# Patient Record
Sex: MALE | Race: WHITE | Employment: FULL TIME | URBAN - NONMETROPOLITAN AREA
[De-identification: names, ages, dates, MRNs, and addresses within clinical notes are randomized per-mention and may not be internally consistent; named-entity substitution may affect disease eponyms.]

---

## 2017-12-20 DIAGNOSIS — R00.2 PALPITATIONS: ICD-10-CM

## 2017-12-20 DIAGNOSIS — I10 ESSENTIAL HYPERTENSION: ICD-10-CM

## 2017-12-20 RX ORDER — METOPROLOL SUCCINATE 25 MG/1
25 TABLET, EXTENDED RELEASE ORAL DAILY
Qty: 30 TABLET | Refills: 5 | Status: SHIPPED | OUTPATIENT
Start: 2017-12-20 | End: 2018-01-03 | Stop reason: DRUGHIGH

## 2018-01-03 ENCOUNTER — OFFICE VISIT (OUTPATIENT)
Dept: CARDIOLOGY | Age: 43
End: 2018-01-03
Payer: COMMERCIAL

## 2018-01-03 VITALS
WEIGHT: 315 LBS | HEIGHT: 72 IN | SYSTOLIC BLOOD PRESSURE: 142 MMHG | HEART RATE: 78 BPM | DIASTOLIC BLOOD PRESSURE: 64 MMHG | BODY MASS INDEX: 42.66 KG/M2

## 2018-01-03 DIAGNOSIS — I10 ESSENTIAL HYPERTENSION: ICD-10-CM

## 2018-01-03 DIAGNOSIS — R00.2 PALPITATIONS: Primary | ICD-10-CM

## 2018-01-03 PROCEDURE — 99213 OFFICE O/P EST LOW 20 MIN: CPT | Performed by: INTERNAL MEDICINE

## 2018-01-03 RX ORDER — METOPROLOL SUCCINATE 50 MG/1
50 TABLET, EXTENDED RELEASE ORAL DAILY
Qty: 30 TABLET | Refills: 5 | Status: SHIPPED | OUTPATIENT
Start: 2018-01-03 | End: 2018-07-17 | Stop reason: SDUPTHER

## 2018-01-03 RX ORDER — METOPROLOL SUCCINATE 50 MG/1
50 TABLET, EXTENDED RELEASE ORAL DAILY
COMMUNITY
End: 2018-01-03 | Stop reason: SDUPTHER

## 2018-01-03 NOTE — PROGRESS NOTES
[facility] Patient Office Visit    Paulina Washington  [address]  Phone: [phone]  Fax: [phone]        1/3/2018    Chief Complaint / Reason for the Visit   Follow up of:  Palpitations and HTN       Specialty Problems        Cardiology Problems    Hypertension              Current Status Today According to the patient:  \"I can definitely tell when I miss my metoprolol\"    Subjective:  Mr. Jailyn Packer is generally feeling stable. Mr. Jailyn Packer has the following cardiac complaints / symptoms today:    1. Palpitations, managed with Metoprolol    2. HTN, Is stable on current medications          Jailyn Packer is a 38 y.o. male with the following history as recorded in [facility]:    Patient Active Problem List    Diagnosis Date Noted    YANI (obstructive sleep apnea) 03/20/2013    Palpitations 08/22/2012    Hypertension 08/22/2012    Psoriasis 08/22/2012     Current Outpatient Prescriptions   Medication Sig Dispense Refill    metoprolol succinate (TOPROL XL) 50 MG extended release tablet Take 50 mg by mouth daily      adalimumab (HUMIRA) 40 MG/0.8ML injection Inject 40 mg into the skin once      ranitidine (ZANTAC) 150 MG tablet Take 150 mg by mouth nightly       chlorthalidone (HYGROTON) 25 MG tablet Take 12.5 mg by mouth daily      enalapril (VASOTEC) 20 MG tablet Take 20 mg by mouth daily      pantoprazole (PROTONIX) 40 MG tablet Take 40 mg by mouth daily. No current facility-administered medications for this visit. Allergies: Review of patient's allergies indicates no known allergies.   Past Medical History:   Diagnosis Date    Hypertension 8/22/2012    YANI (obstructive sleep apnea) 3/20/2013    Palpitations 8/22/2012    Psoriasis 8/22/2012    Sleep apnea      Past Surgical History:   Procedure Laterality Date    HEMORRHOID SURGERY      TONSILLECTOMY       Family History   Problem Relation Age of Onset    Diabetes records:    Patient has a history of these risk factors, which are managed medically, and are on current oral therapy I personally addressed, counselled and educated the patient on this problem / risk factor. I will personally continue and manage prescribed medications and monitor the course of the therapy. Occurs quarterly  Seen in cafeteria and pulse palpitated > 200 beats/min  8/22/12  SE  negative for myocardial ischemia, DTS 4.5 [facility])  8/22/12  Echo normal LVFX  9/26/2012  Holter NSR No Continue current medications:     Yes           2. HTN  show no change   Patient has a history of these risk factors, which are managed medically, and are on current oral therapy I personally addressed, counselled and educated the patient on this problem / risk factor. I will personally continue and manage prescribed medications and monitor the course of the therapy. No Continue current medications:    {Yes           3.   show no change    NA Continue current medications:       NA         Discussed with patient. Follow Up Visit Scheduled for:  1 year    I greatly appreciate the opportunity to meet Negra Settler and your confidence in allowing me to participate in his cardiovascular care. [facility] [address] [place] [address] am scribing for and in the presence of J. Thomas Osgood, MD,[facility]. Kate Ray MA     10:42 AM  I, J. Thomas Osgood, MD, [facility], personally performed the services described in this documentation as scribed by Kate Ray in my presence, and it is both accurate and complete. Electronically signed by Creta Boeck.  Thomas Osgood, MD, [facility]    1/3/18 4:15 PM

## 2018-07-17 RX ORDER — METOPROLOL SUCCINATE 50 MG/1
50 TABLET, EXTENDED RELEASE ORAL DAILY
Qty: 30 TABLET | Refills: 5 | Status: SHIPPED | OUTPATIENT
Start: 2018-07-17 | End: 2019-01-14 | Stop reason: SDUPTHER

## 2019-01-08 ENCOUNTER — TELEPHONE (OUTPATIENT)
Dept: CARDIOLOGY | Age: 44
End: 2019-01-08

## 2019-01-14 ENCOUNTER — OFFICE VISIT (OUTPATIENT)
Dept: CARDIOLOGY | Age: 44
End: 2019-01-14
Payer: COMMERCIAL

## 2019-01-14 VITALS
WEIGHT: 315 LBS | SYSTOLIC BLOOD PRESSURE: 132 MMHG | HEIGHT: 72 IN | HEART RATE: 86 BPM | DIASTOLIC BLOOD PRESSURE: 72 MMHG | BODY MASS INDEX: 42.66 KG/M2

## 2019-01-14 DIAGNOSIS — G47.33 OSA (OBSTRUCTIVE SLEEP APNEA): ICD-10-CM

## 2019-01-14 DIAGNOSIS — R00.2 PALPITATIONS: ICD-10-CM

## 2019-01-14 DIAGNOSIS — I10 ESSENTIAL HYPERTENSION: Primary | ICD-10-CM

## 2019-01-14 PROCEDURE — 99213 OFFICE O/P EST LOW 20 MIN: CPT | Performed by: NURSE PRACTITIONER

## 2019-01-14 RX ORDER — METOPROLOL SUCCINATE 50 MG/1
50 TABLET, EXTENDED RELEASE ORAL DAILY
Qty: 30 TABLET | Refills: 5 | Status: SHIPPED | OUTPATIENT
Start: 2019-01-14 | End: 2020-02-18 | Stop reason: SDUPTHER

## 2019-02-06 ENCOUNTER — TELEPHONE (OUTPATIENT)
Dept: CARDIOLOGY | Age: 44
End: 2019-02-06

## 2019-02-08 ENCOUNTER — OFFICE VISIT (OUTPATIENT)
Dept: CARDIOLOGY | Age: 44
End: 2019-02-08
Payer: COMMERCIAL

## 2019-02-08 ENCOUNTER — TELEPHONE (OUTPATIENT)
Dept: CARDIOLOGY | Age: 44
End: 2019-02-08

## 2019-02-08 VITALS
WEIGHT: 315 LBS | DIASTOLIC BLOOD PRESSURE: 80 MMHG | SYSTOLIC BLOOD PRESSURE: 128 MMHG | BODY MASS INDEX: 42.66 KG/M2 | HEIGHT: 72 IN | HEART RATE: 66 BPM

## 2019-02-08 DIAGNOSIS — R94.39 ABNORMAL STRESS ECHO: ICD-10-CM

## 2019-02-08 DIAGNOSIS — R07.89 OTHER CHEST PAIN: Primary | ICD-10-CM

## 2019-02-08 DIAGNOSIS — I10 ESSENTIAL HYPERTENSION: ICD-10-CM

## 2019-02-08 DIAGNOSIS — E66.01 CLASS 3 SEVERE OBESITY DUE TO EXCESS CALORIES WITHOUT SERIOUS COMORBIDITY WITH BODY MASS INDEX (BMI) OF 40.0 TO 44.9 IN ADULT (HCC): ICD-10-CM

## 2019-02-08 PROCEDURE — 99213 OFFICE O/P EST LOW 20 MIN: CPT | Performed by: CLINICAL NURSE SPECIALIST

## 2019-02-08 ASSESSMENT — ENCOUNTER SYMPTOMS
ABDOMINAL PAIN: 0
APNEA: 1
SHORTNESS OF BREATH: 1
CHEST TIGHTNESS: 1
VOMITING: 0
FACIAL SWELLING: 0
EYE REDNESS: 0
COUGH: 0
WHEEZING: 0
NAUSEA: 0

## 2019-02-11 ENCOUNTER — TELEPHONE (OUTPATIENT)
Dept: CARDIOLOGY | Age: 44
End: 2019-02-11

## 2019-03-06 ENCOUNTER — HOSPITAL ENCOUNTER (OUTPATIENT)
Dept: CARDIAC CATH/INVASIVE PROCEDURES | Age: 44
Discharge: HOME | End: 2019-03-06
Attending: INTERNAL MEDICINE | Admitting: INTERNAL MEDICINE
Payer: COMMERCIAL

## 2019-03-06 ENCOUNTER — APPOINTMENT (OUTPATIENT)
Dept: GENERAL RADIOLOGY | Age: 44
End: 2019-03-06
Attending: INTERNAL MEDICINE
Payer: COMMERCIAL

## 2019-03-06 VITALS
HEART RATE: 80 BPM | RESPIRATION RATE: 18 BRPM | TEMPERATURE: 98.4 F | SYSTOLIC BLOOD PRESSURE: 125 MMHG | BODY MASS INDEX: 42.66 KG/M2 | OXYGEN SATURATION: 99 % | HEIGHT: 72 IN | DIASTOLIC BLOOD PRESSURE: 55 MMHG | WEIGHT: 315 LBS

## 2019-03-06 DIAGNOSIS — R94.39 ABNORMAL STRESS ECHO: ICD-10-CM

## 2019-03-06 LAB
ANION GAP SERPL CALCULATED.3IONS-SCNC: 11 MMOL/L (ref 7–19)
BUN BLDV-MCNC: 19 MG/DL (ref 6–20)
CALCIUM SERPL-MCNC: 9.2 MG/DL (ref 8.6–10)
CHLORIDE BLD-SCNC: 103 MMOL/L (ref 98–111)
CO2: 27 MMOL/L (ref 22–29)
CREAT SERPL-MCNC: 0.9 MG/DL (ref 0.5–1.2)
GFR NON-AFRICAN AMERICAN: >60
GLUCOSE BLD-MCNC: 155 MG/DL (ref 74–109)
HCT VFR BLD CALC: 44.9 % (ref 42–52)
HEMOGLOBIN: 14.5 G/DL (ref 14–18)
MCH RBC QN AUTO: 27.7 PG (ref 27–31)
MCHC RBC AUTO-ENTMCNC: 32.3 G/DL (ref 33–37)
MCV RBC AUTO: 85.7 FL (ref 80–94)
PDW BLD-RTO: 14.2 % (ref 11.5–14.5)
PLATELET # BLD: 191 K/UL (ref 130–400)
PMV BLD AUTO: 10.5 FL (ref 9.4–12.4)
POTASSIUM SERPL-SCNC: 4 MMOL/L (ref 3.5–5)
RBC # BLD: 5.24 M/UL (ref 4.7–6.1)
SODIUM BLD-SCNC: 141 MMOL/L (ref 136–145)
WBC # BLD: 7.4 K/UL (ref 4.8–10.8)

## 2019-03-06 PROCEDURE — 85027 COMPLETE CBC AUTOMATED: CPT

## 2019-03-06 PROCEDURE — 93458 L HRT ARTERY/VENTRICLE ANGIO: CPT | Performed by: INTERNAL MEDICINE

## 2019-03-06 PROCEDURE — 93005 ELECTROCARDIOGRAM TRACING: CPT

## 2019-03-06 PROCEDURE — 36415 COLL VENOUS BLD VENIPUNCTURE: CPT

## 2019-03-06 PROCEDURE — 2709999900 HC NON-CHARGEABLE SUPPLY

## 2019-03-06 PROCEDURE — 99152 MOD SED SAME PHYS/QHP 5/>YRS: CPT | Performed by: INTERNAL MEDICINE

## 2019-03-06 PROCEDURE — 80048 BASIC METABOLIC PNL TOTAL CA: CPT

## 2019-03-06 PROCEDURE — C1760 CLOSURE DEV, VASC: HCPCS

## 2019-03-06 PROCEDURE — 2580000003 HC RX 258: Performed by: INTERNAL MEDICINE

## 2019-03-06 PROCEDURE — C1894 INTRO/SHEATH, NON-LASER: HCPCS

## 2019-03-06 PROCEDURE — 6360000004 HC RX CONTRAST MEDICATION: Performed by: INTERNAL MEDICINE

## 2019-03-06 PROCEDURE — 6360000002 HC RX W HCPCS

## 2019-03-06 PROCEDURE — 71046 X-RAY EXAM CHEST 2 VIEWS: CPT

## 2019-03-06 PROCEDURE — 99153 MOD SED SAME PHYS/QHP EA: CPT | Performed by: INTERNAL MEDICINE

## 2019-03-06 RX ORDER — SODIUM CHLORIDE 9 MG/ML
INJECTION, SOLUTION INTRAVENOUS CONTINUOUS
Status: DISCONTINUED | OUTPATIENT
Start: 2019-03-06 | End: 2019-03-06 | Stop reason: HOSPADM

## 2019-03-06 RX ORDER — ACETAMINOPHEN 500 MG
1500 TABLET ORAL EVERY 6 HOURS PRN
COMMUNITY
End: 2020-02-18

## 2019-03-06 RX ADMIN — SODIUM CHLORIDE: 9 INJECTION, SOLUTION INTRAVENOUS at 08:01

## 2019-03-06 RX ADMIN — IOPAMIDOL 86 ML: 612 INJECTION, SOLUTION INTRAVENOUS at 11:02

## 2019-03-07 LAB
LV EF: 60 %
LVEF MODALITY: NORMAL

## 2019-03-19 LAB
EKG P AXIS: 28 DEGREES
EKG P-R INTERVAL: 208 MS
EKG Q-T INTERVAL: 348 MS
EKG QRS DURATION: 94 MS
EKG QTC CALCULATION (BAZETT): 383 MS
EKG T AXIS: 13 DEGREES

## 2019-08-06 PROCEDURE — 88305 TISSUE EXAM BY PATHOLOGIST: CPT | Performed by: INTERNAL MEDICINE

## 2019-08-08 ENCOUNTER — LAB REQUISITION (OUTPATIENT)
Dept: LAB | Facility: HOSPITAL | Age: 44
End: 2019-08-08

## 2019-08-08 DIAGNOSIS — Z00.00 ROUTINE GENERAL MEDICAL EXAMINATION AT A HEALTH CARE FACILITY: ICD-10-CM

## 2019-08-09 LAB
CYTO UR: NORMAL
LAB AP CASE REPORT: NORMAL
LAB AP CLINICAL INFORMATION: NORMAL
PATH REPORT.FINAL DX SPEC: NORMAL
PATH REPORT.GROSS SPEC: NORMAL

## 2020-02-18 ENCOUNTER — OFFICE VISIT (OUTPATIENT)
Dept: CARDIOLOGY | Age: 45
End: 2020-02-18
Payer: COMMERCIAL

## 2020-02-18 VITALS
DIASTOLIC BLOOD PRESSURE: 80 MMHG | SYSTOLIC BLOOD PRESSURE: 108 MMHG | HEIGHT: 72 IN | HEART RATE: 82 BPM | WEIGHT: 315 LBS | BODY MASS INDEX: 42.66 KG/M2

## 2020-02-18 PROCEDURE — 99213 OFFICE O/P EST LOW 20 MIN: CPT | Performed by: NURSE PRACTITIONER

## 2020-02-18 RX ORDER — METOPROLOL SUCCINATE 50 MG/1
50 TABLET, EXTENDED RELEASE ORAL DAILY
Qty: 30 TABLET | Refills: 5 | Status: SHIPPED | OUTPATIENT
Start: 2020-02-18

## 2020-02-18 NOTE — PATIENT INSTRUCTIONS
Hypertension  (High Blood Pressure)  Most people with high blood pressure (hypertension) have no symptoms. High blood pressure can be a dangerous problem. Hypertension is dangerous because you may have it and not know it. High blood pressure may mean that your heart needs to work harder to pump blood. Your blood pressure is measured with 2 numbers. The first number is when your heart flexes (contracts), and the second number is when your heart relaxes. The higher the numbers are, the more you are at risk for problems. Write down your blood pressure today. The best blood pressure for adults is 120/80 (mmHg) or lower. It is likely that your blood pressure was recorded at least 2 times today. It is important for you to give these numbers to your doctor. If you do not have a doctor, try to get follow-up care at a hospital or community clinic. You may need to start high blood pressure medicine. You may also need to adjust your medicines as told by your doctor. Even mild high blood pressure increases long-term health risks. One high reading does not mean you have hypertension. · Your blood pressure should be taken when you are relaxed. It is also important to sit for about 10 minutes before being tested. · Things that can increase your blood pressure are:  Injury, Illness, Stress, Caffeine, and some medicines (like decongestants). · High blood pressure does not usually need emergency treatment. HOME CARE  · Lifestyle and medicine changes may be needed, including:   · Weight loss. · Exercise. · Limit the use of salt. · Stop smoking. · If using decongestants or birth control pills, talk to your doctor. These medicines might make blood pressure higher. · Do not use street drugs. · Females should not drink more than 1 alcoholic drink per day. Males should not drink more than 2 alcoholic drinks per day. · Take your blood pressure medicine. You will need to take it every day.  If you do not get treated, there are risks, including:   · Heart disease. · Stroke. · Kidney failure. · See your doctor as told. GET HELP RIGHT AWAY IF:  · You get a very bad headache. · You get blurred or changing vision. · You feel confused. · You feel weak, numb, or faint. · You get chest or belly (abdominal) pain. · You throw up (vomit). · You cannot breathe very well. If you have a blood pressure reading with a top number of 180 or higher, you need to see your doctor right away. This is especially true if you are having any of the problems listed above.

## 2020-02-18 NOTE — PROGRESS NOTES
8/22/2012    Psoriasis 8/22/2012    Sleep apnea        Past Surgical History:   Procedure Laterality Date    CHOLECYSTECTOMY      COLONOSCOPY      ENDOSCOPY, COLON, DIAGNOSTIC      HEMORRHOID SURGERY      TONSILLECTOMY         Family History   Problem Relation Age of Onset    Diabetes Mother     High Blood Pressure Mother     Cancer Father     Heart Disease Paternal Grandfather         in 66's       Social History     Socioeconomic History    Marital status:      Spouse name: Not on file    Number of children: Not on file    Years of education: Not on file    Highest education level: Not on file   Occupational History    Not on file   Social Needs    Financial resource strain: Not on file    Food insecurity:     Worry: Not on file     Inability: Not on file    Transportation needs:     Medical: Not on file     Non-medical: Not on file   Tobacco Use    Smoking status: Never Smoker    Smokeless tobacco: Never Used   Substance and Sexual Activity    Alcohol use:  Yes    Drug use: No    Sexual activity: Not on file   Lifestyle    Physical activity:     Days per week: Not on file     Minutes per session: Not on file    Stress: Not on file   Relationships    Social connections:     Talks on phone: Not on file     Gets together: Not on file     Attends [employer] service: Not on file     Active member of club or organization: Not on file     Attends meetings of clubs or organizations: Not on file     Relationship status: Not on file    Intimate partner violence:     Fear of current or ex partner: Not on file     Emotionally abused: Not on file     Physically abused: Not on file     Forced sexual activity: Not on file   Other Topics Concern    Not on file   Social History Narrative    Not on file       No Known Allergies      Current Outpatient Medications:     DiphenhydrAMINE HCl (BANOPHEN PO), Take by mouth, Disp: , Rfl:     acetaminophen (TYLENOL) 500 MG tablet, Take 1,500 mg by mouth every 6 hours as needed for Pain, Disp: , Rfl:     Psyllium (METAMUCIL FIBER PO), Take by mouth daily, Disp: , Rfl:     metoprolol succinate (TOPROL XL) 50 MG extended release tablet, Take 1 tablet by mouth daily (Patient taking differently: Take 100 mg by mouth daily ), Disp: 30 tablet, Rfl: 5    adalimumab (HUMIRA) 40 MG/0.8ML injection, Inject 40 mg into the skin every 14 days , Disp: , Rfl:     ranitidine (ZANTAC) 150 MG tablet, Take 150 mg by mouth nightly , Disp: , Rfl:     chlorthalidone (HYGROTON) 25 MG tablet, Take 12.5 mg by mouth daily, Disp: , Rfl:     enalapril (VASOTEC) 20 MG tablet, Take 20 mg by mouth daily, Disp: , Rfl:     pantoprazole (PROTONIX) 40 MG tablet, Take 40 mg by mouth daily. , Disp: , Rfl:     PE:  There were no vitals filed for this visit. Estimated body mass index is 42.86 kg/m² as calculated from the following:    Height as of this encounter: 6' (1.829 m). Weight as of this encounter: 316 lb (143.3 kg). Constitutional: He is oriented to person, place, and time. He appears well-developed and well-nourished in no acute distress. Head: Normocephalic and atraumatic. Neck:  Neck supple without JVD present. Cardiovascular: Normal rate, Normal rhythm, normal heart sounds. no murmur ascultated. No gallop and no friction rub.  no carotid bruits. No peripheral edema. Pulmonary/Chest:  Lungs clear to auscultation bilaterally without evidence of respiratory distress. He without wheezes. He without rales or ronchi. Musculoskeletal: Normal range of motion. Gait is normal.   Neurological: He is alert and oriented to person, place, and time. Skin: Skin is warm and dry without rash or pallor. Psychiatric: He has a normal mood and affect. His behavior is normal. Thought content normal.     Lab Results   Component Value Date    CREATININE 0.9 03/06/2019    HGB 14.5 03/06/2019     Cath- 3/6/19  1. Successful femoral artery ultrasound  2.   Successful femoral artery arteriogram  3. Supervision of the administration of moderate conscious sedation  4. Mild coronary artery disease  5. Left ventricular function is normal        RECOMMENDATIONS:     1.  Reassurance  2. Risk factor modification  3. Evaluation of etiologies of non cardiac chest discomfort should symptoms persist or progress  4. Follow  Up with Primary care provider as arranged        Assessment, Recommendations, & Plan:  53 y.o. male with Mild CAD, HTN , Palpitations, & YANI    Mild CAD- On Toprol, No changes    HTN- Controlled, No changes    Palpitations- Controlled with Toprol. He will call with any increase in palpitations. No changes    YANI- Uses CPAP      Disposition - RTC in 1 year or sooner if needed      Please do not hesitate to contact me for any questions or concerns.     Sincerely yours,    LESLIE Avila

## 2021-02-17 ENCOUNTER — OFFICE VISIT (OUTPATIENT)
Dept: CARDIOLOGY CLINIC | Age: 46
End: 2021-02-17
Payer: COMMERCIAL

## 2021-02-17 VITALS
WEIGHT: 315 LBS | SYSTOLIC BLOOD PRESSURE: 131 MMHG | DIASTOLIC BLOOD PRESSURE: 75 MMHG | OXYGEN SATURATION: 100 % | HEIGHT: 72 IN | HEART RATE: 93 BPM | BODY MASS INDEX: 42.66 KG/M2

## 2021-02-17 DIAGNOSIS — I10 ESSENTIAL HYPERTENSION: Primary | ICD-10-CM

## 2021-02-17 PROCEDURE — 99213 OFFICE O/P EST LOW 20 MIN: CPT | Performed by: INTERNAL MEDICINE

## 2021-02-17 PROCEDURE — 93000 ELECTROCARDIOGRAM COMPLETE: CPT | Performed by: INTERNAL MEDICINE

## 2021-02-17 RX ORDER — CHOLECALCIFEROL (VITAMIN D3) 1250 MCG
CAPSULE ORAL WEEKLY
COMMUNITY
End: 2022-02-16

## 2021-02-17 RX ORDER — FAMOTIDINE 20 MG/1
20 TABLET, FILM COATED ORAL EVERY EVENING
COMMUNITY

## 2021-02-17 NOTE — PROGRESS NOTES
HISTORY  40-year-old gentleman with a history of hypertension, diabetes, sleep apnea, and reportedly coronary disease returns for routine follow-up visit. Angiographic assessment performed in March 2019, labeled as showing mild coronary disease. His blood pressure and lipids have been reportedly been controlled and after difficulty develop intolerance, he uses his CPAP nightly. On return today he denies ch3st discomfort or significant dyspnea with the only symptoms related occasional very brief palpitation. He gets no regular exercise. PHYSICAL EXAM  On exam carries 316 pounds on a 6 foot frame. Pressure is 131/75 with pulse of 93. Morbidly obese middle-aged gentleman with normal male balding pattern. EOMs full, sclerae and conjunctiva normal. PERRLA. Mask in place. Trachea midline with no neck masses. Assessment of internal jugular veins reveals no elevation of central venous pressure at 45 degrees. Carotid pulses normal without delay or bruit. Thyroid normal to palpation. Chest exam reveals normal respiratory effort, no abnormal breath sounds and normal expiratory phase. No skin lesions seen. PMI normal. S1, S2 normal without murmur or lino or click. Marked abdominal obesity. Normal bowel sounds without palpable mass or bruit. No clubbing or acrocyanosis. 1+ pretibial edema. General motor strength appears to be within normal limits. Normal range of motion with normal gait. Alert, oriented x 3, memory and cognition normal as reflected by history and conversation. EKG revealed normal sinus rhythm without significant abnormality. ASSESSMENT/PLAN:   1. Coronary disease - I reviewed the cath film from March 2019. His circumflex and LAD appear to have separate ostia [no left main] and I see no evidence of atherosclerotic involvement of his left coronary or lower his right. 2.  Hypertension - adequate control  3.   Dyslipidemia - reportedly controlled 4.  Lifestyle - discussed the need for him the benefits of regular exercise    Medical records reviewed prior to today's clinic visit including visually reviewing recent diagnostic studies such as ECHOs, labs, and angiograms as well as reading previous encounter notes. More than 15 minutes spent face-to-face with patient in evaluating, and carefully explaining problems and the planned approach and the reasons behind the decisions.

## 2021-04-10 ENCOUNTER — HOSPITAL ENCOUNTER (EMERGENCY)
Age: 46
Discharge: HOME | End: 2021-04-10
Attending: STUDENT IN AN ORGANIZED HEALTH CARE EDUCATION/TRAINING PROGRAM

## 2021-04-10 ENCOUNTER — APPOINTMENT (OUTPATIENT)
Dept: ULTRASOUND IMAGING | Age: 46
End: 2021-04-10
Attending: PHYSICIAN ASSISTANT

## 2021-04-10 ENCOUNTER — WALK IN (OUTPATIENT)
Dept: URGENT CARE | Age: 46
End: 2021-04-10
Attending: FAMILY MEDICINE

## 2021-04-10 VITALS
OXYGEN SATURATION: 100 % | HEIGHT: 72 IN | HEART RATE: 89 BPM | RESPIRATION RATE: 16 BRPM | BODY MASS INDEX: 42.66 KG/M2 | WEIGHT: 315 LBS | TEMPERATURE: 99.2 F | SYSTOLIC BLOOD PRESSURE: 121 MMHG | DIASTOLIC BLOOD PRESSURE: 84 MMHG

## 2021-04-10 VITALS
RESPIRATION RATE: 14 BRPM | SYSTOLIC BLOOD PRESSURE: 130 MMHG | DIASTOLIC BLOOD PRESSURE: 74 MMHG | HEART RATE: 91 BPM | WEIGHT: 312.83 LBS | OXYGEN SATURATION: 99 % | TEMPERATURE: 98.2 F | BODY MASS INDEX: 42.37 KG/M2 | HEIGHT: 72 IN

## 2021-04-10 DIAGNOSIS — I10 HYPERTENSION, UNSPECIFIED TYPE: ICD-10-CM

## 2021-04-10 DIAGNOSIS — R73.9 HYPERGLYCEMIA: ICD-10-CM

## 2021-04-10 DIAGNOSIS — L03.115 CELLULITIS OF RIGHT LOWER LEG: Primary | ICD-10-CM

## 2021-04-10 DIAGNOSIS — M79.89 REDNESS AND SWELLING OF LOWER LEG: Primary | ICD-10-CM

## 2021-04-10 DIAGNOSIS — R23.8 REDNESS AND SWELLING OF LOWER LEG: Primary | ICD-10-CM

## 2021-04-10 DIAGNOSIS — B34.9 VIRAL SYNDROME: ICD-10-CM

## 2021-04-10 LAB
ALBUMIN SERPL-MCNC: 3.3 G/DL (ref 3.6–5.1)
ALBUMIN/GLOB SERPL: 0.9 {RATIO} (ref 1–2.4)
ALP SERPL-CCNC: 37 UNITS/L (ref 45–117)
ALT SERPL-CCNC: 27 UNITS/L
ANION GAP SERPL CALC-SCNC: 10 MMOL/L (ref 10–20)
APTT PPP: 28 SEC (ref 22–30)
AST SERPL-CCNC: 13 UNITS/L
BASOPHILS # BLD: 0.1 K/MCL (ref 0–0.3)
BASOPHILS NFR BLD: 1 %
BILIRUB SERPL-MCNC: 0.8 MG/DL (ref 0.2–1)
BUN SERPL-MCNC: 17 MG/DL (ref 6–20)
BUN/CREAT SERPL: 18 (ref 7–25)
CALCIUM SERPL-MCNC: 9.3 MG/DL (ref 8.4–10.2)
CHLORIDE SERPL-SCNC: 101 MMOL/L (ref 98–107)
CO2 SERPL-SCNC: 29 MMOL/L (ref 21–32)
CREAT SERPL-MCNC: 0.94 MG/DL (ref 0.67–1.17)
DEPRECATED RDW RBC: 44.4 FL (ref 39–50)
EOSINOPHIL # BLD: 0.1 K/MCL (ref 0–0.5)
EOSINOPHIL NFR BLD: 1 %
ERYTHROCYTE [DISTWIDTH] IN BLOOD: 14.5 % (ref 11–15)
FASTING DURATION TIME PATIENT: ABNORMAL H
FASTING STATUS PATIENT QL REPORTED: ABNORMAL
GFR SERPLBLD BASED ON 1.73 SQ M-ARVRAT: >90 ML/MIN/1.73M2
GLOBULIN SER-MCNC: 3.8 G/DL (ref 2–4)
GLUCOSE SERPL-MCNC: 179 MG/DL (ref 65–99)
GLUCOSE SERPL-MCNC: 196 MG/DL (ref 65–99)
HCT VFR BLD CALC: 43.2 % (ref 39–51)
HGB BLD-MCNC: 14 G/DL (ref 13–17)
IMM GRANULOCYTES # BLD AUTO: 0.1 K/MCL (ref 0–0.2)
IMM GRANULOCYTES # BLD: 1 %
INR PPP: 1
LYMPHOCYTES # BLD: 2.2 K/MCL (ref 1–4.8)
LYMPHOCYTES NFR BLD: 23 %
MCH RBC QN AUTO: 27.2 PG (ref 26–34)
MCHC RBC AUTO-ENTMCNC: 32.4 G/DL (ref 32–36.5)
MCV RBC AUTO: 83.9 FL (ref 78–100)
MONOCYTES # BLD: 0.8 K/MCL (ref 0.3–0.9)
MONOCYTES NFR BLD: 9 %
NEUTROPHILS # BLD: 6.3 K/MCL (ref 1.8–7.7)
NEUTROPHILS NFR BLD: 65 %
NRBC BLD MANUAL-RTO: 0 /100 WBC
PLATELET # BLD AUTO: 157 K/MCL (ref 140–450)
POTASSIUM SERPL-SCNC: 3.4 MMOL/L (ref 3.4–5.1)
PROT SERPL-MCNC: 7.1 G/DL (ref 6.4–8.2)
PROTHROMBIN TIME: 10.8 SEC (ref 9.7–11.8)
RBC # BLD: 5.15 MIL/MCL (ref 4.5–5.9)
SARS-COV+SARS-COV-2 AG RESP QL IA.RAPID: NOT DETECTED
SODIUM SERPL-SCNC: 137 MMOL/L (ref 135–145)
WBC # BLD: 9.5 K/MCL (ref 4.2–11)

## 2021-04-10 PROCEDURE — C9803 HOPD COVID-19 SPEC COLLECT: HCPCS

## 2021-04-10 PROCEDURE — 85730 THROMBOPLASTIN TIME PARTIAL: CPT | Performed by: PHYSICIAN ASSISTANT

## 2021-04-10 PROCEDURE — 99284 EMERGENCY DEPT VISIT MOD MDM: CPT

## 2021-04-10 PROCEDURE — 82962 GLUCOSE BLOOD TEST: CPT | Performed by: FAMILY MEDICINE

## 2021-04-10 PROCEDURE — 85025 COMPLETE CBC W/AUTO DIFF WBC: CPT | Performed by: PHYSICIAN ASSISTANT

## 2021-04-10 PROCEDURE — 87426 SARSCOV CORONAVIRUS AG IA: CPT | Performed by: FAMILY MEDICINE

## 2021-04-10 PROCEDURE — 96374 THER/PROPH/DIAG INJ IV PUSH: CPT

## 2021-04-10 PROCEDURE — 10004651 HB RX, NO CHARGE ITEM: Performed by: FAMILY MEDICINE

## 2021-04-10 PROCEDURE — 99203 OFFICE O/P NEW LOW 30 MIN: CPT

## 2021-04-10 PROCEDURE — 80053 COMPREHEN METABOLIC PANEL: CPT | Performed by: PHYSICIAN ASSISTANT

## 2021-04-10 PROCEDURE — 85610 PROTHROMBIN TIME: CPT | Performed by: PHYSICIAN ASSISTANT

## 2021-04-10 PROCEDURE — 93971 EXTREMITY STUDY: CPT

## 2021-04-10 PROCEDURE — 10002800 HB RX 250 W HCPCS: Performed by: PHYSICIAN ASSISTANT

## 2021-04-10 RX ORDER — ADALIMUMAB 40MG/0.8ML
40 KIT SUBCUTANEOUS
COMMUNITY

## 2021-04-10 RX ORDER — ACETAMINOPHEN 325 MG/1
650 TABLET ORAL ONCE
Status: COMPLETED | OUTPATIENT
Start: 2021-04-10 | End: 2021-04-10

## 2021-04-10 RX ORDER — CEFAZOLIN SODIUM/WATER 2 G/20 ML
2000 SYRINGE (ML) INTRAVENOUS ONCE
Status: COMPLETED | OUTPATIENT
Start: 2021-04-10 | End: 2021-04-10

## 2021-04-10 RX ORDER — FAMOTIDINE 20 MG/1
20 TABLET, FILM COATED ORAL 2 TIMES DAILY
COMMUNITY

## 2021-04-10 RX ORDER — ENALAPRIL MALEATE 20 MG/1
20 TABLET ORAL DAILY
COMMUNITY

## 2021-04-10 RX ORDER — CHLORTHALIDONE 25 MG/1
25 TABLET ORAL DAILY
COMMUNITY

## 2021-04-10 RX ORDER — CEPHALEXIN 500 MG/1
500 CAPSULE ORAL 4 TIMES DAILY
Qty: 40 CAPSULE | Refills: 0 | Status: SHIPPED | OUTPATIENT
Start: 2021-04-10 | End: 2021-04-20

## 2021-04-10 RX ORDER — GLIPIZIDE 10 MG/1
20 TABLET, FILM COATED, EXTENDED RELEASE ORAL DAILY
COMMUNITY

## 2021-04-10 RX ORDER — SULFAMETHOXAZOLE AND TRIMETHOPRIM 800; 160 MG/1; MG/1
1 TABLET ORAL 2 TIMES DAILY
Qty: 20 TABLET | Refills: 0 | Status: SHIPPED | OUTPATIENT
Start: 2021-04-10

## 2021-04-10 RX ORDER — PANTOPRAZOLE SODIUM 40 MG/1
40 TABLET, DELAYED RELEASE ORAL DAILY
COMMUNITY

## 2021-04-10 RX ORDER — METOPROLOL SUCCINATE 100 MG/1
50 TABLET, EXTENDED RELEASE ORAL DAILY
COMMUNITY

## 2021-04-10 RX ADMIN — ACETAMINOPHEN 650 MG: 325 TABLET ORAL at 09:10

## 2021-04-10 RX ADMIN — Medication 2000 MG: at 14:03

## 2021-04-10 SDOH — HEALTH STABILITY: MENTAL HEALTH: HOW OFTEN DO YOU HAVE A DRINK CONTAINING ALCOHOL?: NEVER

## 2021-04-10 ASSESSMENT — ENCOUNTER SYMPTOMS
ENDOCRINE NEGATIVE: 1
COUGH: 1
HEMATOLOGIC/LYMPHATIC NEGATIVE: 1
FATIGUE: 1
COLOR CHANGE: 1
PSYCHIATRIC NEGATIVE: 1
NEUROLOGICAL NEGATIVE: 1

## 2021-04-10 ASSESSMENT — PAIN SCALES - GENERAL: PAINLEVEL_OUTOF10: 6

## 2022-02-16 ENCOUNTER — OFFICE VISIT (OUTPATIENT)
Dept: CARDIOLOGY CLINIC | Age: 47
End: 2022-02-16
Payer: COMMERCIAL

## 2022-02-16 VITALS
HEIGHT: 72 IN | BODY MASS INDEX: 42.66 KG/M2 | SYSTOLIC BLOOD PRESSURE: 122 MMHG | WEIGHT: 315 LBS | HEART RATE: 70 BPM | DIASTOLIC BLOOD PRESSURE: 74 MMHG

## 2022-02-16 DIAGNOSIS — R07.89 OTHER CHEST PAIN: Primary | ICD-10-CM

## 2022-02-16 DIAGNOSIS — R00.2 PALPITATIONS: ICD-10-CM

## 2022-02-16 PROCEDURE — 99213 OFFICE O/P EST LOW 20 MIN: CPT | Performed by: INTERNAL MEDICINE

## 2022-02-16 PROCEDURE — 93000 ELECTROCARDIOGRAM COMPLETE: CPT | Performed by: INTERNAL MEDICINE

## 2022-02-16 RX ORDER — CETIRIZINE HYDROCHLORIDE 10 MG/1
10 TABLET ORAL DAILY
COMMUNITY

## 2022-02-16 RX ORDER — MONTELUKAST SODIUM 4 MG/1
1 TABLET, CHEWABLE ORAL 2 TIMES DAILY
COMMUNITY

## 2022-02-16 NOTE — PROGRESS NOTES
HISTORY  56-year-old gentleman with a history of hypertension, diabetes, and sleep apnea returns for routine follow-up. Previously underwent angiographic assessment in March 2019 which demonstrated separate origins of his LAD and circumflex but no evidence of coronary atherosclerosis. On return today he relates no change in exercise tolerance [he is sedentary], no unusual shortness of breath, no palpitations, and no chest discomfort suspicious for angina. He did have some atypical chest discomfort during an episode of Covid recently at which time he was taken Allegra therapy. His blood pressure has been controlled and reportedly his lipids have been acceptable though he tells me his LDL is upper normal.  He has been vaccinated for COVID-19 but not received a booster. PHYSICAL EXAM  On exam he carries 318 pounds in a 6 foot frame. Pressure is 122/74 pulse of 70. EOMs full, sclerae and conjunctiva normal. PERRLA. Mask in place. Trachea midline with no neck masses. Assessment of internal jugular veins reveals no elevation of central venous pressure at 45 degrees. Carotid pulses normal without delay or bruit. Thyroid normal to palpation. Chest exam reveals normal respiratory effort, no abnormal breath sounds and normal expiratory phase. No skin lesions seen. PMI normal. S1, S2 normal without murmur or lino or click. Marked abdominal obesity. Normal bowel sounds without palpable mass or bruit. No clubbing or acrocyanosis. 1-2+ pretibial edema. General motor strength appears to be within normal limits. Normal range of motion with normal gait. Alert, oriented x 3, memory and cognition normal as reflected by history and conversation. EKG reveals sinus mechanism with some nonspecific ST-T wave changes. ASSESSMENT/PLAN:   1. Dyslipidemia -advised him that his target LDL was less than 100 given his hypertension and diabetes. 2.  Hypertension -adequately controlled.   Continue metoprolol, chlorthalidone, and enalapril. 3.  Pandemic response -vaccinated.   Needs booster

## 2023-01-30 ENCOUNTER — TELEPHONE (OUTPATIENT)
Dept: CARDIOLOGY CLINIC | Age: 48
End: 2023-01-30

## 2023-04-03 ENCOUNTER — TELEPHONE (OUTPATIENT)
Dept: CARDIOLOGY CLINIC | Age: 48
End: 2023-04-03

## 2023-04-03 NOTE — TELEPHONE ENCOUNTER
Pt scheduled on 04/12/2023, [place] out of clinic. Called and lvm to reschedule to next avail with APRN.

## 2023-05-08 ENCOUNTER — OFFICE VISIT (OUTPATIENT)
Dept: CARDIOLOGY CLINIC | Age: 48
End: 2023-05-08
Payer: COMMERCIAL

## 2023-05-08 VITALS
BODY MASS INDEX: 42.66 KG/M2 | DIASTOLIC BLOOD PRESSURE: 68 MMHG | WEIGHT: 315 LBS | HEART RATE: 75 BPM | HEIGHT: 72 IN | SYSTOLIC BLOOD PRESSURE: 130 MMHG

## 2023-05-08 DIAGNOSIS — I10 PRIMARY HYPERTENSION: ICD-10-CM

## 2023-05-08 DIAGNOSIS — E66.01 MORBID OBESITY WITH BMI OF 40.0-44.9, ADULT (HCC): ICD-10-CM

## 2023-05-08 DIAGNOSIS — G47.33 OSA (OBSTRUCTIVE SLEEP APNEA): ICD-10-CM

## 2023-05-08 DIAGNOSIS — I25.10 MILD CAD: Primary | ICD-10-CM

## 2023-05-08 DIAGNOSIS — R42 POSTURAL DIZZINESS: ICD-10-CM

## 2023-05-08 PROCEDURE — 99214 OFFICE O/P EST MOD 30 MIN: CPT | Performed by: CLINICAL NURSE SPECIALIST

## 2023-05-08 PROCEDURE — 3075F SYST BP GE 130 - 139MM HG: CPT | Performed by: CLINICAL NURSE SPECIALIST

## 2023-05-08 PROCEDURE — 93000 ELECTROCARDIOGRAM COMPLETE: CPT | Performed by: CLINICAL NURSE SPECIALIST

## 2023-05-08 PROCEDURE — 3078F DIAST BP <80 MM HG: CPT | Performed by: CLINICAL NURSE SPECIALIST

## 2023-05-08 RX ORDER — WHEAT DEXTRIN 3 G/4 G
1 POWDER IN PACKET (EA) ORAL DAILY
COMMUNITY

## 2023-05-08 ASSESSMENT — ENCOUNTER SYMPTOMS
SHORTNESS OF BREATH: 0
WHEEZING: 0
FACIAL SWELLING: 0
VOMITING: 0
NAUSEA: 0
EYE REDNESS: 0
COUGH: 0
CHEST TIGHTNESS: 0
ABDOMINAL PAIN: 0

## 2023-05-08 NOTE — PATIENT INSTRUCTIONS
Return in about 1 year (around 5/8/2024) for APRN. Work up to 30 min of exercise 5 times per week   Consider DASH or Mediterranean diet for weight loss.    Decrease Chlorthalidone to 1/2 tab daily  Be sure to drink 8 cups of fluid daily  Change positions slowly

## 2023-05-08 NOTE — PROGRESS NOTES
Answer:   Chest pain     Return in about 1 year (around 5/8/2024) for LESLIE. Work up to 30 min of exercise 5 times per week   Consider DASH or Mediterranean diet for weight loss. Decrease Chlorthalidone to 1/2 tab daily  Be sure to drink 8 cups of fluid daily    Call with any questionsor concerns  Follow up with Marlena Rodriguez MD for non cardiac problems  Report any new problems  Cardiovascular Fitness-Exercise as tolerated. Strive for 15 minutes of exercise most days of the week. Cardiac / HealthyDiet  Continue current medications as directed  Continue plan of treatment  It is always recommended that you bring your medicationsbottles with you to each visit - this is for your safety!        LESLIE Dias

## 2024-05-13 ENCOUNTER — OFFICE VISIT (OUTPATIENT)
Dept: CARDIOLOGY CLINIC | Age: 49
End: 2024-05-13
Payer: COMMERCIAL

## 2024-05-13 VITALS
HEIGHT: 72 IN | HEART RATE: 74 BPM | BODY MASS INDEX: 41.99 KG/M2 | SYSTOLIC BLOOD PRESSURE: 108 MMHG | WEIGHT: 310 LBS | DIASTOLIC BLOOD PRESSURE: 70 MMHG

## 2024-05-13 DIAGNOSIS — I10 PRIMARY HYPERTENSION: Primary | ICD-10-CM

## 2024-05-13 DIAGNOSIS — E11.69 TYPE 2 DIABETES MELLITUS WITH OTHER SPECIFIED COMPLICATION, WITHOUT LONG-TERM CURRENT USE OF INSULIN (HCC): ICD-10-CM

## 2024-05-13 DIAGNOSIS — I25.10 MILD CAD: ICD-10-CM

## 2024-05-13 DIAGNOSIS — R00.2 PALPITATIONS: ICD-10-CM

## 2024-05-13 DIAGNOSIS — E66.01 MORBID OBESITY WITH BMI OF 40.0-44.9, ADULT (HCC): ICD-10-CM

## 2024-05-13 DIAGNOSIS — R42 POSTURAL DIZZINESS: ICD-10-CM

## 2024-05-13 DIAGNOSIS — G47.33 OSA (OBSTRUCTIVE SLEEP APNEA): ICD-10-CM

## 2024-05-13 PROCEDURE — 3078F DIAST BP <80 MM HG: CPT | Performed by: CLINICAL NURSE SPECIALIST

## 2024-05-13 PROCEDURE — 99214 OFFICE O/P EST MOD 30 MIN: CPT | Performed by: CLINICAL NURSE SPECIALIST

## 2024-05-13 PROCEDURE — 93000 ELECTROCARDIOGRAM COMPLETE: CPT | Performed by: CLINICAL NURSE SPECIALIST

## 2024-05-13 PROCEDURE — 3074F SYST BP LT 130 MM HG: CPT | Performed by: CLINICAL NURSE SPECIALIST

## 2024-05-13 RX ORDER — MULTIVIT-MIN/IRON/FOLIC ACID/K 18-600-40
CAPSULE ORAL WEEKLY
COMMUNITY

## 2024-05-13 ASSESSMENT — ENCOUNTER SYMPTOMS
COUGH: 0
CHEST TIGHTNESS: 0
EYE REDNESS: 0
FACIAL SWELLING: 0
ABDOMINAL PAIN: 0
VOMITING: 0
NAUSEA: 0
WHEEZING: 0
SHORTNESS OF BREATH: 0

## 2024-05-13 NOTE — PATIENT INSTRUCTIONS
Return for APRN, as needed.  Work up to 30 min of exercise 5 times per week   Consider DASH or Mediterranean diet for weight loss.   Decrease Chlorthalidone to 1/2 tab daily  Be sure to drink 8 cups of fluid daily

## 2024-05-13 NOTE — PROGRESS NOTES
[facility]  [address] [address], [place] [address]  Phone: [phone]  Fax: [phone]    OFFICE VISIT:  2024    Chan Sigala - : 1975    Reason For Visit:  Chan is a 48 y.o. male who is here for Follow-up (No cardiac symptoms) and Mild CAD       Diagnosis Orders   1. Primary hypertension  EKG 12 lead      2. Mild CAD  EKG 12 lead      3. YANI (obstructive sleep apnea)        4. Morbid obesity with BMI of 40.0-44.9, adult ([facility])        5. Postural dizziness        6. Type 2 diabetes mellitus with other specified complication, without long-term current use of insulin ([facility])        7. Palpitations              HPI  Pt with a history of mild, nonobstructive CAD per heart cath in , hypertension, diabetes, morbid obesity, and sleep apnea returns for routine follow-up.  Previously underwent angiographic assessment in 2019 with mild nonobstructive CAD    He is here today for routine follow-up.  He denies chest pain, unusual dyspnea, orthopnea, PND, edema, palpitations.    He continues to have some mild dizziness with position change when he jumps up quickly from a seated position.  We discussed possibly cutting back his chlorthalidone last visit, but he states he is continuing to take a full tablet.    Fauzia Stanley MD is PCP.  Chan Sigala has the following history as recorded in [facility]:    Patient Active Problem List    Diagnosis Date Noted    Abnormal stress echo     Other chest pain 2019    Class 3 severe obesity without serious comorbidity with body mass index (BMI) of 40.0 to 44.9 in adult ([facility]) 2019    YANI (obstructive sleep apnea) 2013    Palpitations 2012    Hypertension 2012    Psoriasis 2012     Past Medical History:   Diagnosis Date    Diabetes mellitus ([facility])     Hypertension 2012    YANI (obstructive sleep apnea) 3/20/2013    Palpitations 2012    Psoriasis 2012    Sleep apnea      Past Surgical